# Patient Record
Sex: MALE | Race: BLACK OR AFRICAN AMERICAN
[De-identification: names, ages, dates, MRNs, and addresses within clinical notes are randomized per-mention and may not be internally consistent; named-entity substitution may affect disease eponyms.]

---

## 2017-11-14 PROBLEM — Z00.00 ENCOUNTER FOR PREVENTIVE HEALTH EXAMINATION: Status: ACTIVE | Noted: 2017-11-14

## 2021-12-09 ENCOUNTER — TRANSCRIPTION ENCOUNTER (OUTPATIENT)
Age: 46
End: 2021-12-09

## 2023-04-26 ENCOUNTER — APPOINTMENT (OUTPATIENT)
Dept: ORTHOPEDIC SURGERY | Facility: CLINIC | Age: 48
End: 2023-04-26
Payer: COMMERCIAL

## 2023-04-26 VITALS — WEIGHT: 160 LBS | HEIGHT: 70 IN | BODY MASS INDEX: 22.9 KG/M2

## 2023-04-26 DIAGNOSIS — Z78.9 OTHER SPECIFIED HEALTH STATUS: ICD-10-CM

## 2023-04-26 DIAGNOSIS — S39.012A STRAIN OF MUSCLE, FASCIA AND TENDON OF LOWER BACK, INITIAL ENCOUNTER: ICD-10-CM

## 2023-04-26 PROCEDURE — 72100 X-RAY EXAM L-S SPINE 2/3 VWS: CPT

## 2023-04-26 PROCEDURE — 99204 OFFICE O/P NEW MOD 45 MIN: CPT

## 2023-04-27 PROBLEM — Z78.9 NON-SMOKER: Status: ACTIVE | Noted: 2023-04-26

## 2023-04-27 PROBLEM — Z78.9 RARELY CONSUMES ALCOHOL: Status: ACTIVE | Noted: 2023-04-26

## 2023-04-27 NOTE — REVIEW OF SYSTEMS
[Arthralgia] : arthralgia [Joint Pain] : joint pain [Joint Stiffness] : joint stiffness [Negative] : Heme/Lymph [FreeTextEntry9] : lumbar spine

## 2023-04-27 NOTE — PHYSICAL EXAM
[Flexion] : flexion [Extension] : extension [Normal Coordination] : normal coordination [Normal DTR UE/LE] : normal DTR UE/LE  [Normal Sensation] : normal sensation [Normal Mood and Affect] : normal mood and affect [Orientated] : orientated [Able to Communicate] : able to communicate [Normal Skin] : normal skin [No Rash] : no rash [No Ulcers] : no ulcers [No Lesions] : no lesions [No obvious lymphadenopathy in areas examined] : no obvious lymphadenopathy in areas examined [Well Developed] : well developed [Peripheral vascular exam is grossly normal] : peripheral vascular exam is grossly normal [No Respiratory Distress] : no respiratory distress [de-identified] : Constitutional:\par - General Appearance:\par Unremarkable\par Body Habitus\par Well Developed\par Well Nourished\par Body Habitus\par No Deformities\par Well Groomed\par Ability To communicate:\par Normal\par Neurologic:\par Global sensation is intact to upper and lower extremities. See examination of Neck and/or Spine\par for exceptions.\par Orientation to Time, Place and Person is: Normal\par Mood And Affect is Normal\par Skin:\par - Head/Face, Right Upper/Lower Extremity, Left Upper/Lower Extremity: Normal\par See Examination of Neck and/or Spine for exceptions\par Cardiovascular:\par Peripheral Cardiovascular System is Normal\par Palpation of Lymph Nodes:\par Normal Palpation of lymph nodes in: Axilla, Cervical, Inguinal\par Abnormal Palpation of lymph nodes in: None  [] : non-antalgic

## 2023-04-27 NOTE — HISTORY OF PRESENT ILLNESS
[de-identified] : 4/26/23: 46 y/o M presenting for an intiial eval of L Spine. He reports hx of lower back pain, but increased severity starting 3/4 wks ago. No recent trauma, but suspects pain may be attributed to increased PA in the gym. Pain focal to the lower back.  No pain, numbness/tingling, or changes in strength in the lower extremities b/l. Naproxen and muscle relaxer prescribed by PCP did reduce the severity of his pains, but he is not returned to baseline. No neurologic symptoms. LE strength intact. General medical health is good. No Diabetes or Hypertension.  [FreeTextEntry5] : The patient is a 47 year old male who presents today complaining of low back pain\par Date of Injury/Onset: 3-4 weeks\par Pain:    At Rest:    4/10 \par With Activity:      7/10 \par Mechanism of injury:  onset, no known injury\par Quality of symptoms:  dull ache, discomfort\par Improves with:  meds\par Worse with:  certain movements\par Prior treatment:  Chiro\par Prior Imaging:   none\par Additional Information: None\par

## 2023-04-27 NOTE — DATA REVIEWED
[I independently reviewed and interpreted images and report] : I independently reviewed and interpreted images and report [I reviewed the films/CD and additionally noted] : I reviewed the films/CD and additionally noted [FreeTextEntry2] : [facility]rpad MRI 2019 modic change, annular tear, disc narrowing L5S1 [FreeTextEntry1] : I stop paperwork reviewed

## 2023-04-27 NOTE — DISCUSSION/SUMMARY
[de-identified] : 49 y/o M with acute lumbar myofascial strain with underling scoliosis and DDD L5/S1.\par in office x-rays Lumbar spine ap/lat demonstrates  mild scoliosis. no vertebra body fractures. mild loss of disc space height L5S1, otherwise reasonable preservation of disc space heights and lordosis. Reviewed MRI from 2019.\par Patient will continue treatment with Naproxen and muscle relaxer as prescribed. Discussed conservative treatments in the form of  core strengthening exercises and PT. Patient was provided with a referral for lumbar physical therapy to work on stretching, strengthening and range of motion. MRI request/possible injections if symptoms do not improve from conservative treatment. F/U in 5/6 wks. \par \par Prior to appointment and during encounter with patient extensive medical records were reviewed including but not limited to, hospital records, outpatient records, imaging results, and lab data.During this appointment the patient was examined, diagnoses were discussed and explained in a face to face manner. In addition extensive time was spent reviewing aforementioned diagnostic studies. Counseling including abnormal image results, differential diagnoses, treatment options, risk and benefits, lifestyle changes, current condition, and current medications was performed. Patient's comments, questions, and concerns were addressed and patient verbalized understanding. Based on this patient's presentation at our office, which is an orthopedic spine surgeon's office, this patient inherently / intrinsically has a risk, however minute, of developing issues such as Cauda equina syndrome, bowel and bladder changes, or progression of motor or neurological deficits such as paralysis which may be permanent.\par \par LYNDSEY LARA Acting as a Scribe for Dr. Domínguez\thom I, Lyndsey Lara, attest that this documentation has been prepared under the direction and in the presence of Provider Jonathon Domínguez MD.

## 2023-04-29 ENCOUNTER — APPOINTMENT (OUTPATIENT)
Dept: ORTHOPEDIC SURGERY | Facility: CLINIC | Age: 48
End: 2023-04-29

## 2023-06-01 ENCOUNTER — APPOINTMENT (OUTPATIENT)
Dept: CARDIOLOGY | Facility: CLINIC | Age: 48
End: 2023-06-01
Payer: COMMERCIAL

## 2023-06-01 ENCOUNTER — NON-APPOINTMENT (OUTPATIENT)
Age: 48
End: 2023-06-01

## 2023-06-01 VITALS
WEIGHT: 156 LBS | BODY MASS INDEX: 22.33 KG/M2 | DIASTOLIC BLOOD PRESSURE: 82 MMHG | RESPIRATION RATE: 16 BRPM | HEIGHT: 70 IN | SYSTOLIC BLOOD PRESSURE: 128 MMHG | HEART RATE: 73 BPM

## 2023-06-01 DIAGNOSIS — R94.31 ABNORMAL ELECTROCARDIOGRAM [ECG] [EKG]: ICD-10-CM

## 2023-06-01 PROCEDURE — 93000 ELECTROCARDIOGRAM COMPLETE: CPT

## 2023-06-01 PROCEDURE — 99204 OFFICE O/P NEW MOD 45 MIN: CPT | Mod: 25

## 2023-07-11 ENCOUNTER — APPOINTMENT (OUTPATIENT)
Dept: CARDIOLOGY | Facility: CLINIC | Age: 48
End: 2023-07-11
Payer: COMMERCIAL

## 2023-07-11 PROCEDURE — 93306 TTE W/DOPPLER COMPLETE: CPT

## 2023-07-11 PROCEDURE — 93015 CV STRESS TEST SUPVJ I&R: CPT

## 2024-03-26 ENCOUNTER — OFFICE (OUTPATIENT)
Dept: URBAN - METROPOLITAN AREA CLINIC 35 | Facility: CLINIC | Age: 49
Setting detail: OPHTHALMOLOGY
End: 2024-03-26
Payer: COMMERCIAL

## 2024-03-26 DIAGNOSIS — H00.14: ICD-10-CM

## 2024-03-26 DIAGNOSIS — D31.01: ICD-10-CM

## 2024-03-26 DIAGNOSIS — H00.15: ICD-10-CM
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
## 2024-03-26 PROCEDURE — BRUDER EYE BRUDER EYE PADS: Performed by: OPHTHALMOLOGY

## 2024-03-26 PROCEDURE — 55555 MISCELLANEOUS CHARGES: CPT | Performed by: OPHTHALMOLOGY

## 2024-03-26 PROCEDURE — 92002 INTRM OPH EXAM NEW PATIENT: CPT | Performed by: OPHTHALMOLOGY

## 2024-03-26 ASSESSMENT — REFRACTION_CURRENTRX
OS_AXIS: 077
OS_OVR_VA: 20/
OS_ADD: +2.00
OD_CYLINDER: -1.50
OD_OVR_VA: 20/
OD_AXIS: 100
OS_CYLINDER: -0.75
OD_SPHERE: -1.25
OD_ADD: +2.00
OS_SPHERE: -1.75
OD_VPRISM_DIRECTION: PROGS
OS_VPRISM_DIRECTION: PROGS

## 2024-04-09 ENCOUNTER — OFFICE (OUTPATIENT)
Dept: URBAN - METROPOLITAN AREA CLINIC 35 | Facility: CLINIC | Age: 49
Setting detail: OPHTHALMOLOGY
End: 2024-04-09
Payer: COMMERCIAL

## 2024-04-09 DIAGNOSIS — H00.15: ICD-10-CM

## 2024-04-09 DIAGNOSIS — H00.14: ICD-10-CM

## 2024-04-09 PROCEDURE — 99213 OFFICE O/P EST LOW 20 MIN: CPT | Performed by: OPHTHALMOLOGY

## 2024-04-15 ENCOUNTER — OFFICE (OUTPATIENT)
Dept: URBAN - METROPOLITAN AREA CLINIC 35 | Facility: CLINIC | Age: 49
Setting detail: OPHTHALMOLOGY
End: 2024-04-15
Payer: COMMERCIAL

## 2024-04-15 DIAGNOSIS — H00.15: ICD-10-CM

## 2024-04-15 DIAGNOSIS — H00.14: ICD-10-CM

## 2024-04-15 PROCEDURE — 92012 INTRM OPH EXAM EST PATIENT: CPT | Performed by: OPHTHALMOLOGY
